# Patient Record
Sex: MALE | Race: WHITE | Employment: OTHER | URBAN - METROPOLITAN AREA
[De-identification: names, ages, dates, MRNs, and addresses within clinical notes are randomized per-mention and may not be internally consistent; named-entity substitution may affect disease eponyms.]

---

## 2021-07-07 ENCOUNTER — APPOINTMENT (OUTPATIENT)
Dept: CT IMAGING | Age: 21
End: 2021-07-07
Payer: COMMERCIAL

## 2021-07-07 ENCOUNTER — APPOINTMENT (OUTPATIENT)
Dept: GENERAL RADIOLOGY | Age: 21
End: 2021-07-07
Payer: COMMERCIAL

## 2021-07-07 ENCOUNTER — HOSPITAL ENCOUNTER (EMERGENCY)
Age: 21
Discharge: HOME | End: 2021-07-08
Attending: STUDENT IN AN ORGANIZED HEALTH CARE EDUCATION/TRAINING PROGRAM
Payer: COMMERCIAL

## 2021-07-07 DIAGNOSIS — S01.81XA FOREHEAD LACERATION, INITIAL ENCOUNTER: ICD-10-CM

## 2021-07-07 DIAGNOSIS — E86.0 DEHYDRATION: ICD-10-CM

## 2021-07-07 DIAGNOSIS — R55 SYNCOPE, UNSPECIFIED SYNCOPE TYPE: Primary | ICD-10-CM

## 2021-07-07 LAB
A/G RATIO: 1.6 (ref 1.1–2.2)
ALBUMIN SERPL-MCNC: 4.6 G/DL (ref 3.4–5)
ALP BLD-CCNC: 102 U/L (ref 40–129)
ALT SERPL-CCNC: 12 U/L (ref 10–40)
ANION GAP SERPL CALCULATED.3IONS-SCNC: 15 MMOL/L (ref 3–16)
AST SERPL-CCNC: 17 U/L (ref 15–37)
BASOPHILS ABSOLUTE: 0 K/UL (ref 0–0.2)
BASOPHILS RELATIVE PERCENT: 0.5 %
BILIRUB SERPL-MCNC: 0.6 MG/DL (ref 0–1)
BILIRUBIN URINE: ABNORMAL
BLOOD, URINE: NEGATIVE
BUN BLDV-MCNC: 11 MG/DL (ref 7–20)
CALCIUM SERPL-MCNC: 9.3 MG/DL (ref 8.3–10.6)
CHLORIDE BLD-SCNC: 98 MMOL/L (ref 99–110)
CLARITY: CLEAR
CO2: 22 MMOL/L (ref 21–32)
COLOR: ABNORMAL
CREAT SERPL-MCNC: 0.9 MG/DL (ref 0.9–1.3)
EOSINOPHILS ABSOLUTE: 0 K/UL (ref 0–0.6)
EOSINOPHILS RELATIVE PERCENT: 0.5 %
EPITHELIAL CELLS, UA: 1 /HPF (ref 0–5)
GFR AFRICAN AMERICAN: >60
GFR NON-AFRICAN AMERICAN: >60
GLOBULIN: 2.9 G/DL
GLUCOSE BLD-MCNC: 115 MG/DL (ref 70–99)
GLUCOSE URINE: NEGATIVE MG/DL
HCT VFR BLD CALC: 44.5 % (ref 40.5–52.5)
HEMOGLOBIN: 15.4 G/DL (ref 13.5–17.5)
HYALINE CASTS: 12 /LPF (ref 0–8)
KETONES, URINE: 40 MG/DL
LEUKOCYTE ESTERASE, URINE: NEGATIVE
LIPASE: 17 U/L (ref 13–60)
LYMPHOCYTES ABSOLUTE: 3.2 K/UL (ref 1–5.1)
LYMPHOCYTES RELATIVE PERCENT: 36.5 %
MAGNESIUM: 2.1 MG/DL (ref 1.8–2.4)
MCH RBC QN AUTO: 31.7 PG (ref 26–34)
MCHC RBC AUTO-ENTMCNC: 34.7 G/DL (ref 31–36)
MCV RBC AUTO: 91.3 FL (ref 80–100)
MICROSCOPIC EXAMINATION: YES
MONOCYTES ABSOLUTE: 0.7 K/UL (ref 0–1.3)
MONOCYTES RELATIVE PERCENT: 8.3 %
NEUTROPHILS ABSOLUTE: 4.7 K/UL (ref 1.7–7.7)
NEUTROPHILS RELATIVE PERCENT: 54.2 %
NITRITE, URINE: NEGATIVE
PDW BLD-RTO: 13.5 % (ref 12.4–15.4)
PH UA: 6 (ref 5–8)
PLATELET # BLD: 282 K/UL (ref 135–450)
PMV BLD AUTO: 8.1 FL (ref 5–10.5)
POTASSIUM REFLEX MAGNESIUM: 3.4 MMOL/L (ref 3.5–5.1)
PROTEIN UA: 30 MG/DL
RBC # BLD: 4.87 M/UL (ref 4.2–5.9)
RBC UA: 1 /HPF (ref 0–4)
SODIUM BLD-SCNC: 135 MMOL/L (ref 136–145)
SPECIFIC GRAVITY UA: >1.03 (ref 1–1.03)
TOTAL PROTEIN: 7.5 G/DL (ref 6.4–8.2)
TROPONIN: <0.01 NG/ML
URINE REFLEX TO CULTURE: ABNORMAL
URINE TYPE: ABNORMAL
UROBILINOGEN, URINE: 0.2 E.U./DL
WBC # BLD: 8.7 K/UL (ref 4–11)
WBC UA: 2 /HPF (ref 0–5)

## 2021-07-07 PROCEDURE — 85025 COMPLETE CBC W/AUTO DIFF WBC: CPT

## 2021-07-07 PROCEDURE — 83690 ASSAY OF LIPASE: CPT

## 2021-07-07 PROCEDURE — 2500000003 HC RX 250 WO HCPCS: Performed by: PHYSICIAN ASSISTANT

## 2021-07-07 PROCEDURE — 96360 HYDRATION IV INFUSION INIT: CPT

## 2021-07-07 PROCEDURE — 2580000003 HC RX 258: Performed by: STUDENT IN AN ORGANIZED HEALTH CARE EDUCATION/TRAINING PROGRAM

## 2021-07-07 PROCEDURE — 70450 CT HEAD/BRAIN W/O DYE: CPT

## 2021-07-07 PROCEDURE — 93005 ELECTROCARDIOGRAM TRACING: CPT | Performed by: STUDENT IN AN ORGANIZED HEALTH CARE EDUCATION/TRAINING PROGRAM

## 2021-07-07 PROCEDURE — 12013 RPR F/E/E/N/L/M 2.6-5.0 CM: CPT

## 2021-07-07 PROCEDURE — 83735 ASSAY OF MAGNESIUM: CPT

## 2021-07-07 PROCEDURE — 84484 ASSAY OF TROPONIN QUANT: CPT

## 2021-07-07 PROCEDURE — 81001 URINALYSIS AUTO W/SCOPE: CPT

## 2021-07-07 PROCEDURE — 99285 EMERGENCY DEPT VISIT HI MDM: CPT

## 2021-07-07 PROCEDURE — 6370000000 HC RX 637 (ALT 250 FOR IP): Performed by: STUDENT IN AN ORGANIZED HEALTH CARE EDUCATION/TRAINING PROGRAM

## 2021-07-07 PROCEDURE — 80053 COMPREHEN METABOLIC PANEL: CPT

## 2021-07-07 PROCEDURE — 71045 X-RAY EXAM CHEST 1 VIEW: CPT

## 2021-07-07 PROCEDURE — 80307 DRUG TEST PRSMV CHEM ANLYZR: CPT

## 2021-07-07 RX ORDER — CEPHALEXIN 500 MG/1
500 CAPSULE ORAL 2 TIMES DAILY
Qty: 14 CAPSULE | Refills: 0 | Status: SHIPPED | OUTPATIENT
Start: 2021-07-07 | End: 2021-07-14

## 2021-07-07 RX ORDER — ACETAMINOPHEN 325 MG/1
650 TABLET ORAL ONCE
Status: COMPLETED | OUTPATIENT
Start: 2021-07-07 | End: 2021-07-07

## 2021-07-07 RX ORDER — 0.9 % SODIUM CHLORIDE 0.9 %
1000 INTRAVENOUS SOLUTION INTRAVENOUS ONCE
Status: COMPLETED | OUTPATIENT
Start: 2021-07-07 | End: 2021-07-07

## 2021-07-07 RX ORDER — IBUPROFEN 600 MG/1
600 TABLET ORAL ONCE
Status: COMPLETED | OUTPATIENT
Start: 2021-07-08 | End: 2021-07-08

## 2021-07-07 RX ORDER — LIDOCAINE HYDROCHLORIDE AND EPINEPHRINE 10; 10 MG/ML; UG/ML
20 INJECTION, SOLUTION INFILTRATION; PERINEURAL ONCE
Status: COMPLETED | OUTPATIENT
Start: 2021-07-07 | End: 2021-07-07

## 2021-07-07 RX ORDER — POTASSIUM CHLORIDE 750 MG/1
20 TABLET, FILM COATED, EXTENDED RELEASE ORAL ONCE
Status: COMPLETED | OUTPATIENT
Start: 2021-07-08 | End: 2021-07-08

## 2021-07-07 RX ADMIN — LIDOCAINE HYDROCHLORIDE,EPINEPHRINE BITARTRATE 20 ML: 10; .01 INJECTION, SOLUTION INFILTRATION; PERINEURAL at 22:43

## 2021-07-07 RX ADMIN — ACETAMINOPHEN 650 MG: 325 TABLET ORAL at 21:08

## 2021-07-07 RX ADMIN — SODIUM CHLORIDE 1000 ML: 9 INJECTION, SOLUTION INTRAVENOUS at 21:07

## 2021-07-07 ASSESSMENT — PAIN SCALES - GENERAL: PAINLEVEL_OUTOF10: 7

## 2021-07-08 VITALS
TEMPERATURE: 97.8 F | OXYGEN SATURATION: 94 % | HEIGHT: 72 IN | HEART RATE: 78 BPM | BODY MASS INDEX: 22.28 KG/M2 | RESPIRATION RATE: 16 BRPM | SYSTOLIC BLOOD PRESSURE: 108 MMHG | WEIGHT: 164.46 LBS | DIASTOLIC BLOOD PRESSURE: 54 MMHG

## 2021-07-08 LAB
AMPHETAMINE SCREEN, URINE: ABNORMAL
BARBITURATE SCREEN URINE: ABNORMAL
BENZODIAZEPINE SCREEN, URINE: ABNORMAL
CANNABINOID SCREEN URINE: POSITIVE
COCAINE METABOLITE SCREEN URINE: ABNORMAL
EKG ATRIAL RATE: 90 BPM
EKG DIAGNOSIS: NORMAL
EKG P AXIS: 76 DEGREES
EKG P-R INTERVAL: 128 MS
EKG Q-T INTERVAL: 378 MS
EKG QRS DURATION: 92 MS
EKG QTC CALCULATION (BAZETT): 462 MS
EKG R AXIS: 77 DEGREES
EKG T AXIS: 41 DEGREES
EKG VENTRICULAR RATE: 90 BPM
Lab: ABNORMAL
METHADONE SCREEN, URINE: ABNORMAL
OPIATE SCREEN URINE: ABNORMAL
OXYCODONE URINE: ABNORMAL
PH UA: 6
PHENCYCLIDINE SCREEN URINE: ABNORMAL
PROPOXYPHENE SCREEN: ABNORMAL

## 2021-07-08 PROCEDURE — 6370000000 HC RX 637 (ALT 250 FOR IP): Performed by: PHYSICIAN ASSISTANT

## 2021-07-08 PROCEDURE — 93010 ELECTROCARDIOGRAM REPORT: CPT | Performed by: INTERNAL MEDICINE

## 2021-07-08 RX ADMIN — POTASSIUM CHLORIDE 20 MEQ: 750 TABLET, FILM COATED, EXTENDED RELEASE ORAL at 00:06

## 2021-07-08 RX ADMIN — IBUPROFEN 600 MG: 600 TABLET, FILM COATED ORAL at 00:06

## 2021-07-08 ASSESSMENT — PAIN - FUNCTIONAL ASSESSMENT
PAIN_FUNCTIONAL_ASSESSMENT: 0-10
PAIN_FUNCTIONAL_ASSESSMENT: ACTIVITIES ARE NOT PREVENTED

## 2021-07-08 ASSESSMENT — PAIN DESCRIPTION - PROGRESSION: CLINICAL_PROGRESSION: NOT CHANGED

## 2021-07-08 ASSESSMENT — PAIN SCALES - GENERAL
PAINLEVEL_OUTOF10: 7
PAINLEVEL_OUTOF10: 7

## 2021-07-08 ASSESSMENT — ENCOUNTER SYMPTOMS
VOMITING: 0
NAUSEA: 0

## 2021-07-08 ASSESSMENT — PAIN DESCRIPTION - LOCATION: LOCATION: HEAD

## 2021-07-08 ASSESSMENT — PAIN DESCRIPTION - DESCRIPTORS: DESCRIPTORS: ACHING

## 2021-07-08 ASSESSMENT — PAIN DESCRIPTION - ONSET: ONSET: ON-GOING

## 2021-07-08 ASSESSMENT — PAIN DESCRIPTION - PAIN TYPE: TYPE: ACUTE PAIN

## 2021-07-08 NOTE — ED PROVIDER NOTES
Primary Care Physician: No primary care provider on file. Attending Physician: No att. providers found     History   Chief Complaint   Patient presents with    Loss of Consciousness     patient was at home when he walked into kitchen and mom reports he fell face first on ground. patient brought by ems from home. Laceration to forehead. patient ETOH yesterday and smoked \"weed earlier today. \" patient is alert and oriented at this time. Mom at bedside. ROBBY Coppola is a 24 y.o. male no significant medical history presenting this afternoon brought by EMS accompanied by mother complaining of a syncope event. Patient stated that the night before he did have significant alcohol almost 10 beers as well as 6 shots. He also smoked marijuana 2 hours before incident. He was in the kitchen walking when all of a sudden he had a syncope event. Stated he is never had a syncope event in the past.  He did fall hitting his head with frontal head lag. Otherwise he is back to his baseline with no focal deficits. History reviewed. No pertinent past medical history. History reviewed. No pertinent surgical history. History reviewed. No pertinent family history. Social History     Socioeconomic History    Marital status: Single     Spouse name: None    Number of children: None    Years of education: None    Highest education level: None   Occupational History    None   Tobacco Use    Smoking status: Current Some Day Smoker    Smokeless tobacco: Never Used   Substance and Sexual Activity    Alcohol use:  Yes    Drug use: Yes     Types: Marijuana    Sexual activity: None   Other Topics Concern    None   Social History Narrative    None     Social Determinants of Health     Financial Resource Strain:     Difficulty of Paying Living Expenses:    Food Insecurity:     Worried About Running Out of Food in the Last Year:     [address] in the Last Year:    Transportation Needs:     Lack of Transportation (Medical):  Lack of Transportation (Non-Medical):    Physical Activity:     Days of Exercise per Week:     Minutes of Exercise per Session:    Stress:     Feeling of Stress :    Social Connections:     Frequency of Communication with Friends and Family:     Frequency of Social Gatherings with Friends and Family:     Attends [employer] Services:     Active Member of Clubs or Organizations:     Attends Club or Organization Meetings:     Marital Status:    Intimate Partner Violence:     Fear of Current or Ex-Partner:     Emotionally Abused:     Physically Abused:     Sexually Abused:         Review of Systems   10 total systems reviewed and found to be negative unless otherwise noted in HPI     Physical Exam   BP (!) 108/54   Pulse 78   Temp 97.8 °F (36.6 °C) (Oral)   Resp 16   Ht 6' (1.829 m)   Wt 164 lb 7.4 oz (74.6 kg)   SpO2 94%   BMI 22.31 kg/m²      CONSTITUTIONAL: Well appearing, in no acute distress   HEAD: normocephalic   EYES: PERRL, No injection, discharge or scleral icterus. ENT: Moist mucous membranes. NECK: Normal ROM, NO LAD   CARDIOVASCULAR: Regular rate and rhythm. No murmurs or gallop. PULMONARY/CHEST: Airway patent. No retractions. Breath sounds clear with good air entry bilaterally. ABDOMEN: Soft, Non-distended and non-tender, without guarding or rebound. SKIN: Acyanotic, warm, dry   MUSCULOSKELETAL: No swelling, tenderness or deformity   NEUROLOGICAL: Awake and oriented x 3. Pulses intact. Grossly nonfocal   Nursing note and vitals reviewed.            ED Course & Medical Decision Making   Medications   acetaminophen (TYLENOL) tablet 650 mg (650 mg Oral Given 7/7/21 2108)   0.9 % sodium chloride bolus (0 mLs Intravenous Stopped 7/7/21 2211)   lidocaine-EPINEPHrine 1 %-1:[id number] injection 20 mL (20 mLs Intradermal Given by Other 7/7/21 2243)   potassium chloride (KLOR-CON) extended release tablet 20 mEq (20 mEq Oral Given 7/8/21 0006)   ibuprofen (ADVIL;MOTRIN) tablet 600 mg (600 mg Oral Given 7/8/21 0006)      Labs Reviewed   COMPREHENSIVE METABOLIC PANEL W/ REFLEX TO MG FOR LOW K - Abnormal; Notable for the following components:       Result Value    Sodium 135 (*)     Potassium reflex Magnesium 3.4 (*)     Chloride 98 (*)     Glucose 115 (*)     All other components within normal limits    Narrative:     Performed at:  [facility]  [address] [facility] [place]   Phone [phone]   URINE RT REFLEX TO CULTURE - Abnormal; Notable for the following components:    Bilirubin Urine SMALL (*)     Ketones, Urine 40 (*)     Protein, UA 30 (*)     All other components within normal limits    Narrative:     Performed at:  [facility]  [address] [facility] [place]   Phone [phone]   URINE DRUG SCREEN - Abnormal; Notable for the following components:    Cannabinoid Scrn, Ur POSITIVE (*)     All other components within normal limits    Narrative:     Performed at:  [facility]  [address] [facility] [place]   Phone [phone]   MICROSCOPIC URINALYSIS - Abnormal; Notable for the following components:    Hyaline Casts, UA 12 (*)     All other components within normal limits    Narrative:     Performed at:  [facility]  [address] [facility] [place]   Phone [phone]   CBC WITH AUTO DIFFERENTIAL    Narrative:     Performed at:  [address] [facility] [place]   Phone [phone]   TROPONIN    Narrative:     Performed at:  [facility]  [address] [facility] [place] [address]   Phone [phone]   LIPASE    Narrative:     Performed at:  [facility]  [address] [place] [address] [place]   Phone [phone]   MAGNESIUM    Narrative: DISCONTINUED MEDICATIONS:  Discharge Medication List as of 7/8/2021 12:12 AM        DISPOSITION Decision To Discharge 07/07/2021 11:56:59 PM  -We have instructed the patient, Ramez Kovacs) to return to the ED or call his PCP if his pain/symptoms worsen. -Findings and recommendations explained to patient. He expressed understanding and agreed with the plan. Tomasz Qiu MD (electronically signed)  7/8/2021  _________________________________________________________________________________________  _________________________________________________________________________________________  This record is transcribed utilizing voice recognition technology. There are inherent limitations in this technology. In addition, there may be limitations in editing of this report. If there are any discrepancies, please contact me directly.         Tomasz Qiu MD  07/08/21 7180

## 2021-07-08 NOTE — ED PROVIDER NOTES
EKG Interpretation    Interpreted by emergency department physician  Time performed: 2103  Time read: 2106    Rhythm: Sinus  Ventricular Rate: 90  QRS Axis: 77  Ectopy: Marked sinus arrhythmia  Conduction: Normal sinus rhythm with marked sinus arrhythmia and LVH by voltage and early repolarization abnormality   ST Segments: Consistent with early repolarization abnormality  T Waves: Consistent with early repolarization abnormality  Q Waves: None noted    Other findings: Motion artifact but EKG is readable    Compared to EKG on: No old EKG to compare    Clinical Impression: Normal sinus rhythm with marked sinus arrhythmia and LVH by voltage with early repolarization abnormality. There is motion artifact but EKG is readable. There is no old EKG to compare.     [address], [address], [place]  07/07/21 2109

## 2021-07-08 NOTE — ED NOTES
Bed: E-43  Expected date:   Expected time:   Means of arrival:   Comments:  [place] syncope      [address] Alex Spence RN  07/07/21 2039

## 2021-07-08 NOTE — ED PROVIDER NOTES
[address]  [address] [address]  Dept: [phone]  Loc: [phone]  eMERGENCYdEPARTMENT eNCOUnter      Pt Name: Joelle Green  MRN: [id number]  [place] 2000  Date of evaluation: 7/7/2021  Provider:Vanessa Mcclure PA-C    CHIEF COMPLAINT       Chief Complaint   Patient presents with    Loss of Consciousness     patient was at home when he walked into kitchen and mom reports he fell face first on ground. patient brought by ems from home. Laceration to forehead. patient ETOH yesterday and smoked \"weed earlier today. \" patient is alert and oriented at this time. Mom at bedside. CRITICAL CARE TIME   Total Critical Care time was 0 minutes, excluding separately reportable procedures. There was a high probability of clinically significant/life threatening deterioration in the patient's condition which required my urgentintervention. HISTORY OF PRESENT ILLNESS  (Location/Symptom, Timing/Onset, Context/Setting, Quality, Duration,Modifying Factors, Severity.)   Joelle Green is a 24 y.o. male who presents to the emergency department by EMS following a syncopal episode. Patient drink significant alcohol yesterday and smoked weed earlier today. Patient is walking to the kitchen to get something to eat when he passed out. Patient sustained a laceration to his forehead. He is unsure tetanus status. Apart from pain to forehead, denies any other complaints myself. Pain rated 7/10. Nursing Notes were reviewedand agreed with or any disagreements were addressed in the HPI. REVIEW OF SYSTEMS    (2-9 systems for level 4, 10 or more for level 5)     Review of Systems   Constitutional: Negative for chills and fever. HENT: Negative. Gastrointestinal: Negative for nausea and vomiting. Skin: Positive for wound. Neurological: Positive for syncope.    Psychiatric/Behavioral: Negative for behavioral problems and confusion. Except as noted above the remainder of the review of systems was reviewed and negative. PAST MEDICAL HISTORY   History reviewed. No pertinent past medical history. SURGICAL HISTORY     History reviewed. No pertinent surgical history. CURRENT MEDICATIONS     [unfilled]    ALLERGIES     Patient has no known allergies. FAMILY HISTORY     History reviewed. No pertinent family history. No family status information on file. SOCIAL HISTORY      reports that he has been smoking. He has never used smokeless tobacco. He reports current alcohol use. He reports current drug use. Drug: Marijuana. PHYSICAL EXAM    (up to 7 for level 4, 8 or more for level 5)     ED Triage Vitals [07/07/21 2041]   Enc Vitals Group      BP (!) 110/91      Pulse 98      Resp 16      Temp 97.8 °F (36.6 °C)      Temp Source Oral      SpO2 100 %      Weight 164 lb 7.4 oz (74.6 kg)      Height 6' (1.829 m)      Head Circumference       Peak Flow       Pain Score       Pain Loc       Pain Edu? Excl. in [facility]? Physical Exam  Vitals reviewed. Constitutional:       Appearance: Normal appearance. HENT:      Head: Normocephalic. Cardiovascular:      Rate and Rhythm: Normal rate and regular rhythm. Pulmonary:      Effort: Pulmonary effort is normal. No respiratory distress. Musculoskeletal:      Cervical back: Normal range of motion and neck supple. Skin:     General: Skin is warm. Comments: Forehead laceration as below   Neurological:      General: No focal deficit present. Mental Status: He is alert and oriented to person, place, and time.    Psychiatric:         Mood and Affect: Mood normal.         Behavior: Behavior normal.               DIAGNOSTIC RESULTS     EKG: All EKG's are interpreted by the Emergency Department Physician who either signs or Co-signs this chart in the absence of a cardiologist.    RADIOLOGY:   Non-plain film images such as CT, Ultrasound and MRI are read by the radiologist. Plain radiographic images are visualized and preliminarilyinterpreted by the emergency physician with the below findings:    Interpretation per the Radiologist below,if available at the time of this note:    XR CHEST PORTABLE   Final Result   No acute cardiopulmonary process. CT Head WO Contrast   Final Result   No acute intracranial abnormality. No acute intracranial hemorrhage or mass   effect. Left frontal scalp laceration with adjacent tiny dermal focus of   hyperattenuation which could represent a small foreign body. No acute   displaced skull fracture.                LABS:  Labs Reviewed   COMPREHENSIVE METABOLIC PANEL W/ REFLEX TO MG FOR LOW K - Abnormal; Notable for the following components:       Result Value    Sodium 135 (*)     Potassium reflex Magnesium 3.4 (*)     Chloride 98 (*)     Glucose 115 (*)     All other components within normal limits    Narrative:     Performed at:  [address] [place] [employer] [place] [address]   Phone [phone]   URINE RT REFLEX TO CULTURE - Abnormal; Notable for the following components:    Bilirubin Urine SMALL (*)     Ketones, Urine 40 (*)     Protein, UA 30 (*)     All other components within normal limits    Narrative:     Performed at:  [address] [place] [employer] [address]   Phone [phone]   URINE DRUG SCREEN - Abnormal; Notable for the following components:    Cannabinoid Scrn, Ur POSITIVE (*)     All other components within normal limits    Narrative:     Performed at:  [address] [place] [employer] [address]   Phone [phone]   MICROSCOPIC URINALYSIS - Abnormal; Notable for the following components:    Hyaline Casts, UA 12 (*)     All other components within normal limits    Narrative:     Performed at:  [facility]  [address] [place] [address]., [place], [place] [address]   Phone [phone]   CBC WITH AUTO DIFFERENTIAL    Narrative:     Performed at:  [facility]  [address] [place] [address]   Phone [phone]   TROPONIN    Narrative:     Performed at:  [facility]  [address] [place], [place] [address]   Phone [phone]   LIPASE    Narrative:     Performed at:  [facility]  [address]  [place] [address]   Phone [phone]   MAGNESIUM    Narrative:     Performed at:  [facility]  [address] [facility] [place]  [place] [address]   Phone [phone]       All other labs were within normal range or not returned as of this dictation. EMERGENCY DEPARTMENT COURSE and DIFFERENTIAL DIAGNOSIS/MDM:   Vitals:    Vitals:    07/07/21 2300 07/07/21 2331 07/07/21 2346 07/08/21 0001   BP: 108/60 114/63 (!) 112/54 (!) 108/54   Pulse: 89 89 87 78   Resp: 16 13 19 16   Temp:       TempSrc:       SpO2: 98% 97% 99% 94%   Weight:       Height:           MDM     Patient presents ED with HPI noted above. Patient with a syncopal episode at home while walking in the kitchen. Initial evaluation work-up obtained by Dr. Joseph Solis. Work-up, exam and history as below. Plan at time of Dr. Cruz Guard departure was discharged home following suture repair. Sutures completed by myself. Cardiac work-up obtained. EKG per my attending. Troponin less than 0.01. Patient with no chest pain. No arrhythmia noted. No further emergent cardiac evaluation indicated. Basic labs obtained. CBC showed no leukocytosis/leukopenia. H&H normal.  CMP showed mild hyponatremia with a sodium 135, mild hypokalemia potassium 3.4, Klor-Con given. CO2, anion gap normal.  No renal or hepatic impairment. Urine showed ketones. Patient smoke marijuana drink significant alcohol yesterday.   Do suspect symptoms secondary to do suspect symptom secondary to dehydration and substance use/alcohol intake. He was given 1 the IV fluid in the ED. Physical exam as above. Laceration forehead as noted above. Laceration by myself as noted below. CT mentioned possible foreign object. No foreign object identified on exam.  Wound copiously irrigated in the ED. Did place on Keflex given this potential.  Patient educated on this potential into observing for signs of infection. He voiced understanding. He is educated on wound care. Sutures removed in 5 to 7 days. Patient unsure tetanus status. Declined tetanus in the ED. Will think about at home, possibly get as outpatient. Regarding head injury, CT head without contrast showed no acute intercranial abnormality. Patient and mother educated on signs of concussion, educated on signs that should prompt reevaluation in the ED regarding head injury. They voiced understanding. Patient discharged home in stable condition. PCP referral provided time of discharge. The patient tolerated their visit well. They were seen and evaluated by the attending physician, Dr. Diana Lowry who agreed with the assessment and plan. The patient and / or the family were informed of the results of any tests, a time was given to answer questions, a plan was proposed and they agreed with plan. CONSULTS:  None    PROCEDURES:  Lac Repair    Date/Time: 7/8/2021 12:11 AM  Performed by: Collins Borrero PA-C  Authorized by: Sourav Marie MD     Consent:     Consent obtained:  Verbal    Consent given by:  Patient    Risks discussed:  Pain, poor cosmetic result, infection and retained foreign body    Alternatives discussed:  Observation  Anesthesia (see MAR for exact dosages):      Anesthesia method:  Local infiltration    Local anesthetic:  Lidocaine 1% WITH epi  Laceration details:     Location:  Face    Face location:  Forehead    Length (cm):  3  Repair type:     Repair type:  Simple  Pre-procedure details:     Preparation: Patient was prepped and draped in usual sterile fashion  Exploration:     Hemostasis achieved with:  Direct pressure    Wound exploration: wound explored through full range of motion      Contaminated: no    Treatment:     Area cleansed with:  Mak-Vibha    Amount of cleaning:  Standard    Irrigation solution:  Sterile saline  Skin repair:     Repair method:  Sutures    Suture size:  5-0    Wound skin closure material used: ethilon. Suture technique:  Simple interrupted    Number of sutures:  4  Post-procedure details:     Dressing:  Open (no dressing)    Patient tolerance of procedure: Tolerated well, no immediate complications        FINAL IMPRESSION      1. Syncope, unspecified syncope type    2. Dehydration    3. Forehead laceration, initial encounter          DISPOSITION/PLAN   [unfilled]    PATIENT REFERRED TO:  [facility] Emergency Department  [address] [place] [address] [address]  [phone]  Go to   If symptoms worsen    [facility]) Pre-Services  [phone]  Schedule an appointment as soon as possible for a visit in 1 week  For follow up and reevaluation.       DISCHARGE MEDICATIONS:  New Prescriptions    CEPHALEXIN (KEFLEX) 500 MG CAPSULE    Take 1 capsule by mouth 2 times daily for 7 days       (Please note that portions of this note were completed with a voice recognition program.  Efforts were made to edit the dictations but occasionally words are mis-transcribed.)    [address] [facility]          [address] [facility]  07/08/21 ASHANTI Peterson  07/08/21 0344

## 2021-07-15 ENCOUNTER — HOSPITAL ENCOUNTER (EMERGENCY)
Age: 21
Discharge: HOME | End: 2021-07-15
Payer: COMMERCIAL

## 2021-07-15 VITALS
HEIGHT: 72 IN | BODY MASS INDEX: 22.31 KG/M2 | OXYGEN SATURATION: 97 % | RESPIRATION RATE: 18 BRPM | TEMPERATURE: 98 F | HEART RATE: 94 BPM | SYSTOLIC BLOOD PRESSURE: 125 MMHG | DIASTOLIC BLOOD PRESSURE: 70 MMHG

## 2021-07-15 DIAGNOSIS — Z48.02 VISIT FOR SUTURE REMOVAL: Primary | ICD-10-CM

## 2021-07-15 PROCEDURE — 99283 EMERGENCY DEPT VISIT LOW MDM: CPT

## 2021-07-16 NOTE — ED PROVIDER NOTES
[address] [address] [place] [facility] [address]  Dept: [phone]  Loc: [address] ENCOUNTER        This patient was not seen or evaluated by the attending physician. I evaluated this patient, the attending physician was available for consultation. CHIEF COMPLAINT    Chief Complaint   Patient presents with    Suture / Staple Removal     suture removal from last thursday        HPI    Belén Hager is a 24 y.o. male who is here for suture removal. The patient denies any new localized pain, increased redness or swelling. The location of the wound is the forehead. REVIEW OF SYSTEMS    General: No fever or chills  Skin: see HPI     PAST MEDICAL & SURGICAL HISTORY    No past medical history on file. No past surgical history on file. CURRENT MEDICATIONS        ALLERGIES    No Known Allergies    PHYSICAL EXAM    VITAL SIGNS: /70   Pulse 94   Temp 98 °F (36.7 °C) (Oral)   Resp 14   Ht 6' (1.829 m)   SpO2 97%   BMI 22.31 kg/m²    Constitutional:  Patient appears comfortable  Neurologic: The patient is awake, alert, no slurred speech  Integument:  4 sutures removed to left forehead. Wound edges clean and dry and well approximated. It is well-healing. No surrounding erythema. RADIOLOGY  None    PROCEDURE  Suture Removal Procedure Note  Details of the Procedure: x4 sutures were removed using the standard sterile instruments, no complications. Patient tolerated the procedure well. Labs Reviewed - No data to display      ED COURSE & MEDICAL DECISION MAKING    The area of the wound shows no signs of infection. Medications - No data to display    I instructed the patient to follow up as an outpatient in 2 days. I instructed the patient to return to the ED immediately for any new or worsening symptoms. The patient verbalizes understanding. FINAL IMPRESSION    1.  Visit for suture removal

## 2022-05-05 ENCOUNTER — APPOINTMENT (OUTPATIENT)
Dept: GENERAL RADIOLOGY | Age: 22
End: 2022-05-05
Payer: COMMERCIAL

## 2022-05-05 ENCOUNTER — HOSPITAL ENCOUNTER (EMERGENCY)
Age: 22
Discharge: HOME | End: 2022-05-05
Attending: EMERGENCY MEDICINE
Payer: COMMERCIAL

## 2022-05-05 VITALS
DIASTOLIC BLOOD PRESSURE: 82 MMHG | WEIGHT: 170.5 LBS | SYSTOLIC BLOOD PRESSURE: 151 MMHG | RESPIRATION RATE: 20 BRPM | BODY MASS INDEX: 23.12 KG/M2 | TEMPERATURE: 97.8 F | OXYGEN SATURATION: 98 % | HEART RATE: 74 BPM

## 2022-05-05 DIAGNOSIS — R07.9 CHEST PAIN, UNSPECIFIED TYPE: Primary | ICD-10-CM

## 2022-05-05 PROCEDURE — 99284 EMERGENCY DEPT VISIT MOD MDM: CPT

## 2022-05-05 PROCEDURE — 93005 ELECTROCARDIOGRAM TRACING: CPT | Performed by: EMERGENCY MEDICINE

## 2022-05-05 PROCEDURE — 71046 X-RAY EXAM CHEST 2 VIEWS: CPT

## 2022-05-05 NOTE — ED PROVIDER NOTES
I have seen and evaluated this patient with my supervising physician Yolanda Sanchez MD.      Chief Complaint:   Chief Complaint   Patient presents with    Chest Pain     c/o chest tightness and \"twitching\" and feels super tight x 7 days        ED Course & Medical Decision Making  25 y.o. male presenting with right-sided chest tightness on and off x1 week. -EKG: Benign early repol  -Chest xr: Lungs clear  -PERC: negative     MDM: This well-appearing 25year-old has a low risk history. An unremarkable physical exam.  His work-up is also not concerning. I spoke with him about returning to the ED should his symptoms worsen. I suggested that he make an appointment with a primary care doctor for further evaluation of his symptoms. I suspect that it is musculoskeletal due to his recent increase in basketball playing. Final Clinical Impression & Plan:  Chest wall muscle spasm  - f/u with PCP  - ED return precautions given and reviewed, questions answered. ---------------------------------------------------------------------------------------------------------------  HPI:  PMH significant for none    Presenting with 57-year-old male presents emerged department for evaluation of right chest tightness sensation. The tightness sensation occurs 3-4 times per day and last for 1 to 2 seconds at a time. When the patient palpates his chest during these incidences it feels slightly tighter than the other side. He denies shortness of breath/fever/chills/nausea/vomiting. He denies trauma, but does report increasing basketball over the past week. He is not used to playing basketball, and feels as though this may be contributing. No family history of early cardiac death. No history of blood clots. He takes no medications. He does smoke marijuana daily. Medical Hx: Past medical history reviewed, and pertinent for:     History reviewed. No pertinent past medical history.     There is no problem list on file for this patient. Surgical Hx:   Past surgical history reviewed, and pertinent for:      History reviewed. No pertinent surgical history. Social Hx:       Social History     Socioeconomic History    Marital status: Single     Spouse name: Not on file    Number of children: Not on file    Years of education: Not on file    Highest education level: Not on file   Occupational History    Not on file   Tobacco Use    Smoking status: Current Some Day Smoker    Smokeless tobacco: Never Used   Substance and Sexual Activity    Alcohol use: Yes    Drug use: Yes     Types: Marijuana Charlaine Mazin)    Sexual activity: Not on file   Other Topics Concern    Not on file   Social History Narrative    Not on file     Social Determinants of Health     Financial Resource Strain:     Difficulty of Paying Living Expenses: Not on file   Food Insecurity:     Worried About Running Out of Food in the Last Year: Not on file    Adela of Food in the Last Year: Not on file   Transportation Needs:     Lack of Transportation (Medical): Not on file    Lack of Transportation (Non-Medical):  Not on file   Physical Activity:     Days of Exercise per Week: Not on file    Minutes of Exercise per Session: Not on file   Stress:     Feeling of Stress : Not on file   Social Connections:     Frequency of Communication with Friends and Family: Not on file    Frequency of Social Gatherings with Friends and Family: Not on file    Attends Hindu Services: Not on file    Active Member of Clubs or Organizations: Not on file    Attends Club or Organization Meetings: Not on file    Marital Status: Not on file   Intimate Partner Violence:     Fear of Current or Ex-Partner: Not on file    Emotionally Abused: Not on file    Physically Abused: Not on file    Sexually Abused: Not on file   Housing Stability:     Unable to Pay for Housing in the Last Year: Not on file    Number of [place] in the Last Year: Not on file    Unstable Housing in the Last Year: Not on file         Medications: There are no discharge medications for this patient. Allergies:  Patient has no known allergies. ROS:  10pt review of systems was performed and was negative except as noted in HPI     Imaging:  XR CHEST (2 VW)   Final Result   No acute process. Labs:  Results for orders placed or performed during the hospital encounter of 05/05/22   EKG 12 Lead   Result Value Ref Range    Ventricular Rate 63 BPM    Atrial Rate 63 BPM    P-R Interval 134 ms    QRS Duration 92 ms    Q-T Interval 386 ms    QTc Calculation (Bazett) 395 ms    P Axis 57 degrees    R Axis 76 degrees    T Axis 57 degrees    Diagnosis       Normal sinus rhythm with sinus arrhythmiaPossible Left atrial enlargementBorderline ECG           Screenings:     [place] Coma Scale  Eye Opening: Spontaneous  Best Verbal Response: Oriented  Best Motor Response: Obeys commands  Jord[place] Coma Scale Score: 15              Physical Exam:  Vitals: BP (!) 151/82   Pulse 74   Temp 97.8 °F (36.6 °C) (Oral)   Resp 20   Wt 170 lb 8 oz (77.3 kg)   SpO2 98%   BMI 23.12 kg/m²    General: awake, alert, no apparent distress  Pupils: equal, reactive  Eyes: EOM intact, conjunctiva clear, no discharge  Head: Non-traumatic  Neck: Supple  Mouth: Moist, no oral lesions, no tonsillar enlargement  Heart: Rate as noted, regular rhythm, no murmur or rubs. Chest/Lungs: CTAB, no wheezes or crackles. Non tender. Abdomen: soft, nondistended, no tenderness to palpation   Back: No midline tenderness. No CVA tenderness  Extremities:  2+ distal pulses bilateral UE and LE, no tenderness of calves, no edema. RUE full rom w/o pain. Neuro: CNII-XII grossly normal.  Bilateral hand strength intact. Skin: Warm.  No visible rash, lesions, or bruising           MARELY Devine        [place], [place]  05/05/22 1776

## 2022-05-05 NOTE — ED PROVIDER NOTES
EKG is reviewed by myself. Dated today at 18. Rate 63 sinus rhythm sinus arrhythmia. LVH pattern.      Modesta Love MD  05/05/22 9427

## 2022-05-05 NOTE — ED PROVIDER NOTES
I independently performed a history and physical on [facility]. I personally saw the patient and performed a substantive portion of the visit including all aspects of the medical decision making. Briefly, this is a 25 y.o. male here for chest discomfort. Twitching/tight in character over the right nipple. Nonradiating. Nonexertional.  Not improved by rest.  Last for seconds at a time. Occurred while I was speaking with the patient. No nausea or vomiting. No diaphoresis. No fevers or chills or cough. No family history of cardiac disease at an early age. No cocaine or methamphetamine use. Denies prior medical history. He did recently start playing basketball so he thinks that it may be secondary to a muscle issue. He also thinks that it may be due to anxiety. .    On exam,   General: Patient is in no acute distress  Skin: No cyanosis  HEENT: Moist mucous membranes  Heart: Regular rate, regular rhythm. No murmurs  Lung: No respiratory distress. ctabl  Abdomen: Soft, nontender  Neuro: Moving all extremities, no facial droop, no slurred speech, answers questions appropriately        EKG  The Ekg interpreted by me in the absence of a cardiologist shows. Normal sinus rhythm  No acute ST changes   HR 63  No significant EKG changes compared to study in 7/21  NICHELLE      Screenings   [place] Coma Scale  Eye Opening: Spontaneous  Best Verbal Response: Oriented  Best Motor Response: Obeys commands  Jordyn Coma Scale Score: 15        MDM  Briefly, this is a 25 y.o. male here for chest tightness lasting for a second at a time. Very atypical for ACS. Patient requests that we do not order lab work at this time which I am okay with. EKG shows benign early repol similar to prior EKG. No tachycardia, tachypnea, hypoxia, unilateral DVT symptoms or history of hypercoagulable state, making him low risk by PE RC criteria for pulmonary embolism.   I believe that risk of radiation exposure to chest outweighs benefit for CT scan. Obtaining chest x-ray. Doubt pneumothorax/pneumonia. May be from anxiety or musculoskeletal which patient tells me himself. Patient Referrals:  No follow-up provider specified. Discharge Medications:  New Prescriptions    No medications on file       FINAL IMPRESSION  1. Chest pain, unspecified type        Blood pressure (!) 151/82, pulse 74, temperature 97.8 °F (36.6 °C), temperature source Oral, resp. rate 20, weight 170 lb 8 oz (77.3 kg), SpO2 98 %. For further details of [facility] emergency department encounter, please see documentation by advanced practice provider, Owen Weldon.         Iain Bishop MD  05/05/22 4660

## 2022-05-06 LAB
EKG ATRIAL RATE: 63 BPM
EKG DIAGNOSIS: NORMAL
EKG P AXIS: 57 DEGREES
EKG P-R INTERVAL: 134 MS
EKG Q-T INTERVAL: 386 MS
EKG QRS DURATION: 92 MS
EKG QTC CALCULATION (BAZETT): 395 MS
EKG R AXIS: 76 DEGREES
EKG T AXIS: 57 DEGREES
EKG VENTRICULAR RATE: 63 BPM

## 2022-05-06 PROCEDURE — 93010 ELECTROCARDIOGRAM REPORT: CPT | Performed by: INTERNAL MEDICINE
